# Patient Record
Sex: FEMALE | Race: WHITE | NOT HISPANIC OR LATINO | Employment: OTHER | URBAN - METROPOLITAN AREA
[De-identification: names, ages, dates, MRNs, and addresses within clinical notes are randomized per-mention and may not be internally consistent; named-entity substitution may affect disease eponyms.]

---

## 2024-03-21 ENCOUNTER — TELEPHONE (OUTPATIENT)
Dept: GASTROENTEROLOGY | Facility: CLINIC | Age: 79
End: 2024-03-21
Payer: COMMERCIAL

## 2024-03-21 NOTE — TELEPHONE ENCOUNTER
Please call this patient to schedule an office visit with an BONITA. Previous [facility] patient and is due for procedures. Thanks.

## 2024-04-05 PROBLEM — Z85.118 HISTORY OF LUNG CANCER: Status: ACTIVE | Noted: 2018-09-07

## 2024-04-05 PROBLEM — E66.01 OBESITY, CLASS III, BMI 40-49.9 (MORBID OBESITY) (MULTI): Status: ACTIVE | Noted: 2017-09-17

## 2024-04-05 PROBLEM — M10.9 GOUT: Status: ACTIVE | Noted: 2024-04-05

## 2024-04-05 PROBLEM — M25.559 PAIN IN JOINT INVOLVING PELVIC REGION AND THIGH: Status: ACTIVE | Noted: 2018-08-27

## 2024-04-05 PROBLEM — N83.202 BILATERAL OVARIAN CYSTS: Status: ACTIVE | Noted: 2017-02-11

## 2024-04-05 PROBLEM — I89.0 LYMPHEDEMA: Status: ACTIVE | Noted: 2022-07-06

## 2024-04-05 PROBLEM — I25.10 CORONARY ARTERY DISEASE INVOLVING NATIVE CORONARY ARTERY OF NATIVE HEART WITHOUT ANGINA PECTORIS: Chronic | Status: ACTIVE | Noted: 2018-03-28

## 2024-04-05 PROBLEM — M53.3 COCCYXDYNIA: Status: ACTIVE | Noted: 2024-04-05

## 2024-04-05 PROBLEM — N83.201 BILATERAL OVARIAN CYSTS: Status: ACTIVE | Noted: 2017-02-11

## 2024-04-05 PROBLEM — D3A.090: Status: ACTIVE | Noted: 2018-09-07
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
## 2024-04-05 PROBLEM — M16.9 OA (OSTEOARTHRITIS) OF HIP: Status: ACTIVE | Noted: 2024-04-05

## 2024-04-05 PROBLEM — M19.91 LOCALIZED, PRIMARY OSTEOARTHRITIS: Status: ACTIVE | Noted: 2023-03-28

## 2024-04-05 PROBLEM — J44.1 ACUTE EXACERBATION OF CHRONIC OBSTRUCTIVE PULMONARY DISEASE (MULTI): Status: ACTIVE | Noted: 2017-11-12

## 2024-04-05 PROBLEM — R06.02 SHORTNESS OF BREATH: Status: ACTIVE | Noted: 2017-06-27

## 2024-04-05 RX ORDER — HYDROCODONE BITARTRATE AND ACETAMINOPHEN 5; 325 MG/1; MG/1
1 TABLET ORAL EVERY 8 HOURS PRN
COMMUNITY
Start: 2024-04-01 | End: 2024-05-01

## 2024-04-05 RX ORDER — ERGOCALCIFEROL 1.25 MG/1
50000 CAPSULE ORAL WEEKLY
COMMUNITY
Start: 2024-02-05

## 2024-04-05 RX ORDER — ALBUTEROL SULFATE 90 UG/1
AEROSOL, METERED RESPIRATORY (INHALATION)
COMMUNITY
Start: 2008-09-18

## 2024-04-05 RX ORDER — LOSARTAN POTASSIUM 50 MG/1
TABLET ORAL
COMMUNITY
Start: 2024-03-04

## 2024-04-05 RX ORDER — ASPIRIN 81 MG/1
TABLET ORAL
COMMUNITY
Start: 2024-03-04

## 2024-04-05 RX ORDER — LEVOTHYROXINE SODIUM 50 UG/1
TABLET ORAL
COMMUNITY

## 2024-04-05 RX ORDER — METOPROLOL TARTRATE 50 MG/1
TABLET ORAL
COMMUNITY
Start: 2023-12-04

## 2024-04-23 ENCOUNTER — APPOINTMENT (OUTPATIENT)
Dept: GASTROENTEROLOGY | Facility: CLINIC | Age: 79
End: 2024-04-23
Payer: COMMERCIAL

## 2024-06-25 ENCOUNTER — APPOINTMENT (OUTPATIENT)
Dept: GASTROENTEROLOGY | Facility: CLINIC | Age: 79
End: 2024-06-25
Payer: COMMERCIAL

## 2024-06-26 PROBLEM — G89.29 CHRONIC MIDLINE LOW BACK PAIN WITHOUT SCIATICA: Status: ACTIVE | Noted: 2024-05-12

## 2024-06-26 PROBLEM — L51.1 STEVENS-JOHNSON SYNDROME (MULTI): Status: ACTIVE | Noted: 2024-05-18

## 2024-06-26 PROBLEM — K59.00 CONSTIPATION, UNSPECIFIED: Status: ACTIVE | Noted: 2024-05-18

## 2024-06-26 PROBLEM — E78.5 HYPERLIPIDEMIA, UNSPECIFIED: Status: ACTIVE | Noted: 2017-09-21

## 2024-06-26 PROBLEM — J45.909 UNSPECIFIED ASTHMA, UNCOMPLICATED (HHS-HCC): Status: ACTIVE | Noted: 2017-09-21

## 2024-06-26 PROBLEM — K21.9 GASTRO-ESOPHAGEAL REFLUX DISEASE WITHOUT ESOPHAGITIS: Status: ACTIVE | Noted: 2017-09-21

## 2024-06-26 PROBLEM — I71.03 DISSECTION OF THORACOABDOMINAL AORTA (MULTI): Status: ACTIVE | Noted: 2024-05-15

## 2024-06-26 PROBLEM — M87.052 IDIOPATHIC ASEPTIC NECROSIS OF LEFT FEMUR (MULTI): Status: ACTIVE | Noted: 2024-05-18

## 2024-06-26 PROBLEM — M54.50 CHRONIC MIDLINE LOW BACK PAIN WITHOUT SCIATICA: Status: ACTIVE | Noted: 2024-05-12

## 2024-06-26 PROBLEM — M50.90 CERVICAL DISC DISORDER, UNSPECIFIED, UNSPECIFIED CERVICAL REGION: Status: ACTIVE | Noted: 2024-05-18

## 2024-06-26 PROBLEM — Z90.89 ACQUIRED ABSENCE OF OTHER ORGANS: Status: ACTIVE | Noted: 2024-05-18

## 2024-06-26 PROBLEM — F17.211 NICOTINE DEPENDENCE, CIGARETTES, IN REMISSION: Status: ACTIVE | Noted: 2024-05-18

## 2024-06-26 PROBLEM — F33.9 MAJOR DEPRESSIVE DISORDER, RECURRENT, UNSPECIFIED (CMS-HCC): Status: ACTIVE | Noted: 2017-09-21

## 2024-06-26 PROBLEM — E66.01 MORBID (SEVERE) OBESITY DUE TO EXCESS CALORIES (MULTI): Status: ACTIVE | Noted: 2024-05-18

## 2024-06-26 PROBLEM — I71.02: Status: ACTIVE | Noted: 2024-05-10

## 2024-06-26 PROBLEM — F41.9 ANXIETY DISORDER, UNSPECIFIED: Status: ACTIVE | Noted: 2017-09-21

## 2024-06-26 PROBLEM — M53.3 SACROCOCCYGEAL DISORDERS, NOT ELSEWHERE CLASSIFIED: Status: ACTIVE | Noted: 2024-05-18

## 2024-06-26 PROBLEM — I10 PRIMARY HYPERTENSION: Status: ACTIVE | Noted: 2017-09-21

## 2024-06-26 PROBLEM — E55.9 VITAMIN D DEFICIENCY, UNSPECIFIED: Status: ACTIVE | Noted: 2024-05-18

## 2024-07-15 NOTE — PROGRESS NOTES
"REASON FOR VISIT:  GI symptoms    HPI:  Renata Taveras is a 78 y.o. female who presents for a new patient visit     She has multiple medical issues -  Admitted to [facility] in May with SOB, cough - found to have a small thoracic aneurysm - seen by vascular surgery - opted for conservative management  -lung cancer - s/p lobectomy  -COPD - on O2 as needed  -colon polyps - last Colonoscopy 2020 - due in 2025  -GERD - last EGD many years ago ([facility])    GI issues   1) She has been having some oropharyngeal dysphagia with liquids - improved with avoiding drinking out of staw - she had a small thyroid nodule that resolved   2) GERD sx - she does not take anything - she reports some improvement with belching. She took Axid many years ago   3) she has been having some pain in her back - she feels a crunching sensation - separate from her GI sx   4) Hiatal hernia, ulcer - noted on previous EGD with Dr. Villareal -    5) constipation from pain meds - she takes dulcolax daily   6) lactose intolerance - she avoids dairy         Med list notable for    Labs notable for    No fhx of CRC.       Prev endoscopic eval:   >> Colonoscopy 10/2020 Dr. Villareal - repeat in 5 years   Impression:     - Diverticulosis in the entire examined colon.                         - The examination was otherwise normal.                         - No specimens collected.    REVIEW OF SYSTEMS    Review of Systems   Cardiovascular:  Negative for chest pain.   Respiratory:  Positive for shortness of breath.    Gastrointestinal:  Positive for bloating, constipation, dysphagia and heartburn. Negative for abdominal pain, change in bowel habit, bowel incontinence, diarrhea, hematemesis, hematochezia, jaundice, melena, nausea and vomiting.          Allergies   Allergen Reactions    Budesonide-Formoterol Shortness of breath    Ibuprofen GI Upset    Tiotropium Bromide Shortness of breath    Zolpidem Tartrate Other     Pt states she \"sleep walks\" with this medication. "       No past medical history on file.    No past surgical history on file.    No family history on file.    Social History     Tobacco Use    Smoking status: Not on file    Smokeless tobacco: Not on file   Substance Use Topics    Alcohol use: Not on file       Current Outpatient Medications   Medication Sig Dispense Refill    aspirin 81 mg EC tablet       ergocalciferol (Vitamin D-2) 1.25 MG (04629 UT) capsule Take 1 capsule (50,000 Units) by mouth once a week.      levothyroxine (Synthroid, Levoxyl) 50 mcg tablet Take 1 tablet x 6 days and take 2 tablets on Sundays      losartan (Cozaar) 50 mg tablet       metoprolol tartrate (Lopressor) 50 mg tablet       ProAir HFA 90 mcg/actuation inhaler        No current facility-administered medications for this visit.       PHYSICAL EXAM:  There were no vitals taken for this visit.     Physical Exam  Constitutional:       Appearance: Normal appearance.   HENT:      Head: Normocephalic and atraumatic.   Cardiovascular:      Rate and Rhythm: Normal rate and regular rhythm.   Pulmonary:      Effort: Pulmonary effort is normal.      Breath sounds: Wheezing present.   Abdominal:      General: Bowel sounds are normal. There is no distension.      Tenderness: There is abdominal tenderness. There is no guarding or rebound.   Musculoskeletal:         General: Swelling present.   Neurological:      General: No focal deficit present.      Mental Status: She is alert.          ASSESSMENT    78-year-old female presents to [facility] care.  She has an extensive medical history including COPD, lung cancer, coronary disease, obesity, and Carlos Elvis syndrome.  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  She was evaluated by vascular surgery and no intervention was required.  She has multiple GI issues including a history of colon polyps her last  colonoscopy in 2020 was negative for any polyps and was recommended for repeat in 5 years.  She has a history of heartburn and a hiatal hernia and possible peptic ulcer disease on previous EGDs by Dr. Rajinder Villareal.  Her last endoscopy was many years ago.  She was on a PPI at 1 point in time but is no longer taking it.  She has some oropharyngeal dysphagia that improves with dietary measures and some heartburn and epigastric discomfort likely related to the hiatal hernia and acid reflux.  I would recommend omeprazole 40 mg once a day which she is agreeable to.    She has chronic constipation related to chronic narcotic use which she takes 1 Dulcolax tablet a day to have regular bowel movements.  Her colonoscopies are up-to-date  Given her underlying cardiopulmonary disease further endoscopy risk may outweigh the potential benefits.  We will have her follow-up in the office in 3 months.  If her symptoms do not improve we can consider repeating an upper endoscopy but I have reviewed with her the potential risks    Evaluation requested by Dr. Earl Amaya MD.   My final recommendations will be communicated back to the requesting physician by way of shared Medical record or letter to requesting physician via fax.      Attending Note:   I have personally performed a face to face assessment of this Patient, which included an interview, physical exam and Assessment and Plan.  I have reviewed and confirmed the history and key findings as documented by the Medical Assistant and edited as appropriate.     Signature: Danielle Alcaraz MD    Date: 7/15/2024  Time: 3:28 PM

## 2024-07-16 ENCOUNTER — APPOINTMENT (OUTPATIENT)
Dept: GASTROENTEROLOGY | Facility: CLINIC | Age: 79
End: 2024-07-16
Payer: COMMERCIAL

## 2024-07-16 DIAGNOSIS — K59.03 DRUG-INDUCED CONSTIPATION: ICD-10-CM

## 2024-07-16 DIAGNOSIS — K21.9 GASTRO-ESOPHAGEAL REFLUX DISEASE WITHOUT ESOPHAGITIS: Primary | ICD-10-CM

## 2024-07-16 DIAGNOSIS — K44.9 HIATAL HERNIA: ICD-10-CM

## 2024-07-16 PROCEDURE — 99204 OFFICE O/P NEW MOD 45 MIN: CPT | Performed by: INTERNAL MEDICINE

## 2024-07-16 RX ORDER — OMEPRAZOLE 20 MG/1
40 CAPSULE, DELAYED RELEASE ORAL
Qty: 60 CAPSULE | Refills: 1 | Status: SHIPPED | OUTPATIENT
Start: 2024-07-16 | End: 2024-09-14

## 2024-07-16 ASSESSMENT — ENCOUNTER SYMPTOMS
JAUNDICE: 0
CONSTIPATION: 1
HEMATEMESIS: 0
NAUSEA: 0
ABDOMINAL PAIN: 0
VOMITING: 0
HEMATOCHEZIA: 0
SHORTNESS OF BREATH: 1
DIARRHEA: 0
CHANGE IN BOWEL HABIT: 0
BLOATING: 1
BOWEL INCONTINENCE: 0
HEARTBURN: 1

## 2024-07-22 ENCOUNTER — TELEPHONE (OUTPATIENT)
Dept: GASTROENTEROLOGY | Facility: CLINIC | Age: 79
End: 2024-07-22
Payer: COMMERCIAL

## 2024-07-22 NOTE — TELEPHONE ENCOUNTER
Omeprazole 20mg #60 caps for 30days Prior auth approved 06/17/24-7/22/25 Case # [id number] . Pt notified .

## 2024-07-22 NOTE — TELEPHONE ENCOUNTER
Patient called; she said she thought Dr. Alcaraz wanted her to take 40 mg omeprazole, but the prescription that is ready to be picked up is for 20 mg.  Please advise.  Thank you.

## 2024-09-23 DIAGNOSIS — K21.9 GASTRO-ESOPHAGEAL REFLUX DISEASE WITHOUT ESOPHAGITIS: ICD-10-CM

## 2024-09-23 DIAGNOSIS — K44.9 HIATAL HERNIA: ICD-10-CM

## 2024-09-23 RX ORDER — OMEPRAZOLE 20 MG/1
40 CAPSULE, DELAYED RELEASE ORAL
Qty: 180 CAPSULE | Refills: 3 | Status: SHIPPED | OUTPATIENT
Start: 2024-09-23 | End: 2025-09-18

## 2024-10-15 ENCOUNTER — APPOINTMENT (OUTPATIENT)
Dept: GASTROENTEROLOGY | Facility: CLINIC | Age: 79
End: 2024-10-15
Payer: COMMERCIAL